# Patient Record
Sex: FEMALE | Race: BLACK OR AFRICAN AMERICAN | NOT HISPANIC OR LATINO | URBAN - METROPOLITAN AREA
[De-identification: names, ages, dates, MRNs, and addresses within clinical notes are randomized per-mention and may not be internally consistent; named-entity substitution may affect disease eponyms.]

---

## 2020-07-25 ENCOUNTER — TELEPHONE ENCOUNTER (OUTPATIENT)
Dept: URBAN - METROPOLITAN AREA CLINIC 13 | Facility: CLINIC | Age: 65
End: 2020-07-25

## 2020-07-25 RX ORDER — POLYETHYLENE GLYCOL 3350, SODIUM CHLORIDE, SODIUM BICARBONATE AND POTASSIUM CHLORIDE WITH LEMON FLAVOR 420; 11.2; 5.72; 1.48 G/4L; G/4L; G/4L; G/4L
TAKE 1/2 GALLON AT 5:00 PM DAY BEFORE PROCEDURE, TAKE SECOND 1/2 OF GALLON 6 HRS PRIOR TO PROCEDURE POWDER, FOR SOLUTION ORAL
Qty: 1 | Refills: 0 | OUTPATIENT
Start: 2018-08-27 | End: 2018-09-06

## 2020-07-25 RX ORDER — SUCRALFATE 1 G/1
TAKE 1 TABLET 3 TIMES DAILY PRN WITH MEALS TABLET ORAL
Qty: 90 | Refills: 2 | OUTPATIENT
Start: 2012-11-30 | End: 2013-01-31

## 2020-07-25 RX ORDER — POLYETHYLENE GLYCOL 3350, SODIUM CHLORIDE, SODIUM BICARBONATE AND POTASSIUM CHLORIDE WITH LEMON FLAVOR 420; 11.2; 5.72; 1.48 G/4L; G/4L; G/4L; G/4L
TAKE 1/2 GALLON AT 5:00 PM DAY BEFORE PROCEDURE, TAKE SECOND 1/2 OF GALLON 6 HRS PRIOR TO PROCEDURE POWDER, FOR SOLUTION ORAL
Qty: 1 | Refills: 0 | OUTPATIENT
Start: 2018-09-06 | End: 2018-09-10

## 2020-07-25 RX ORDER — VENLAFAXINE HYDROCHLORIDE 75 MG/1
TAKE 1 CAPSULE ONCE DAILY WITH FOOD CAPSULE, EXTENDED RELEASE ORAL
Qty: 30 | Refills: 3 | OUTPATIENT
Start: 2013-01-31 | End: 2013-03-26

## 2020-07-26 ENCOUNTER — TELEPHONE ENCOUNTER (OUTPATIENT)
Dept: URBAN - METROPOLITAN AREA CLINIC 13 | Facility: CLINIC | Age: 65
End: 2020-07-26

## 2023-04-26 ENCOUNTER — LAB OUTSIDE AN ENCOUNTER (OUTPATIENT)
Dept: URBAN - METROPOLITAN AREA CLINIC 113 | Facility: CLINIC | Age: 68
End: 2023-04-26

## 2023-04-26 ENCOUNTER — WEB ENCOUNTER (OUTPATIENT)
Dept: URBAN - METROPOLITAN AREA CLINIC 113 | Facility: CLINIC | Age: 68
End: 2023-04-26

## 2023-04-26 ENCOUNTER — OFFICE VISIT (OUTPATIENT)
Dept: URBAN - METROPOLITAN AREA CLINIC 113 | Facility: CLINIC | Age: 68
End: 2023-04-26
Payer: MEDICARE

## 2023-04-26 VITALS
BODY MASS INDEX: 29.15 KG/M2 | WEIGHT: 196.8 LBS | HEIGHT: 69 IN | HEART RATE: 78 BPM | SYSTOLIC BLOOD PRESSURE: 107 MMHG | TEMPERATURE: 97.3 F | DIASTOLIC BLOOD PRESSURE: 71 MMHG | RESPIRATION RATE: 18 BRPM

## 2023-04-26 DIAGNOSIS — K21.9 GASTROESOPHAGEAL REFLUX DISEASE, UNSPECIFIED WHETHER ESOPHAGITIS PRESENT: ICD-10-CM

## 2023-04-26 DIAGNOSIS — R10.13 EPIGASTRIC PAIN: ICD-10-CM

## 2023-04-26 PROCEDURE — 99204 OFFICE O/P NEW MOD 45 MIN: CPT | Performed by: INTERNAL MEDICINE

## 2023-04-26 RX ORDER — OMEPRAZOLE 40 MG/1
1 CAPSULE 30 MINUTES BEFORE MORNING MEAL CAPSULE, DELAYED RELEASE ORAL ONCE A DAY
Qty: 30 | OUTPATIENT
Start: 2023-04-26

## 2023-04-26 NOTE — HPI-TODAY'S VISIT:
Ms. Morales is a 67-year-old woman with a history of GERD presenting for evaluation of GERD and epigastric pain.  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  At that visit her right lower quadrant pain had resolved.  Her GERD symptoms were controlled on Zantac 300 mg daily.  Her last colonoscopy was performed on 9/10/2018 for colorectal cancer screening with findings notable for ascending colon and sigmoid colon diverticulosis and internal hemorrhoids.  She is recommended to repeat colon cancer screening in 10 years.  She reports having epigastric pain that is burning in character, waxes and wanes and occasionally feels "raw".  The pain is not exacerbated by eating but does improved taking Prilosec over-the-counter daily.  She denies any nausea or vomiting.  No NSAID use.  No weight loss.  She continues to have heartburn and regurgitation but no dysphagia.  Her bowel habits are regular normal consistency.  No melena or red blood per rectum.

## 2023-05-23 ENCOUNTER — OFFICE VISIT (OUTPATIENT)
Dept: URBAN - METROPOLITAN AREA SURGERY CENTER 25 | Facility: SURGERY CENTER | Age: 68
End: 2023-05-23
Payer: MEDICARE

## 2023-05-23 ENCOUNTER — CLAIMS CREATED FROM THE CLAIM WINDOW (OUTPATIENT)
Dept: URBAN - METROPOLITAN AREA CLINIC 4 | Facility: CLINIC | Age: 68
End: 2023-05-23
Payer: MEDICARE

## 2023-05-23 DIAGNOSIS — K31.A0 GASTRIC INTESTINAL METAPLASIA, UNSPECIFIED: ICD-10-CM

## 2023-05-23 DIAGNOSIS — R10.13 EPIGASTRIC PAIN: ICD-10-CM

## 2023-05-23 DIAGNOSIS — K29.60 OTHER GASTRITIS WITHOUT BLEEDING: ICD-10-CM

## 2023-05-23 DIAGNOSIS — K21.9 GASTROESOPHAGEAL REFLUX DISEASE, UNSPECIFIED WHETHER ESOPHAGITIS PRESENT: ICD-10-CM

## 2023-05-23 PROCEDURE — 43239 EGD BIOPSY SINGLE/MULTIPLE: CPT | Performed by: INTERNAL MEDICINE

## 2023-05-23 PROCEDURE — 88305 TISSUE EXAM BY PATHOLOGIST: CPT | Performed by: PATHOLOGY

## 2023-05-23 PROCEDURE — G8907 PT DOC NO EVENTS ON DISCHARG: HCPCS | Performed by: INTERNAL MEDICINE

## 2023-05-23 PROCEDURE — 88342 IMHCHEM/IMCYTCHM 1ST ANTB: CPT | Performed by: PATHOLOGY

## 2023-05-23 RX ORDER — OMEPRAZOLE 40 MG/1
1 CAPSULE 30 MINUTES BEFORE MORNING MEAL CAPSULE, DELAYED RELEASE ORAL ONCE A DAY
Qty: 30 | Status: ACTIVE | COMMUNITY
Start: 2023-04-26

## 2023-06-22 ENCOUNTER — DASHBOARD ENCOUNTERS (OUTPATIENT)
Age: 68
End: 2023-06-22

## 2023-06-22 ENCOUNTER — OFFICE VISIT (OUTPATIENT)
Dept: URBAN - METROPOLITAN AREA CLINIC 113 | Facility: CLINIC | Age: 68
End: 2023-06-22
Payer: MEDICARE

## 2023-06-22 VITALS
RESPIRATION RATE: 20 BRPM | HEIGHT: 69 IN | SYSTOLIC BLOOD PRESSURE: 155 MMHG | HEART RATE: 93 BPM | TEMPERATURE: 97.6 F | WEIGHT: 193 LBS | BODY MASS INDEX: 28.58 KG/M2 | DIASTOLIC BLOOD PRESSURE: 97 MMHG

## 2023-06-22 DIAGNOSIS — K29.50 CHRONIC GASTRITIS WITHOUT BLEEDING, UNSPECIFIED GASTRITIS TYPE: ICD-10-CM

## 2023-06-22 DIAGNOSIS — R10.13 EPIGASTRIC PAIN: ICD-10-CM

## 2023-06-22 DIAGNOSIS — K21.9 GERD WITHOUT ESOPHAGITIS: ICD-10-CM

## 2023-06-22 PROBLEM — 266435005: Status: ACTIVE | Noted: 2023-06-22

## 2023-06-22 PROBLEM — 8493009: Status: ACTIVE | Noted: 2023-06-22

## 2023-06-22 PROCEDURE — 99214 OFFICE O/P EST MOD 30 MIN: CPT | Performed by: NURSE PRACTITIONER

## 2023-06-22 RX ORDER — OMEPRAZOLE 20 MG/1
1 CAPSULE DAILY AS NEEDED CAPSULE, DELAYED RELEASE ORAL
Status: ACTIVE | COMMUNITY
Start: 2023-04-26

## 2023-06-22 RX ORDER — OMEPRAZOLE 40 MG/1
1 CAPSULE 30 MINUTES BEFORE MORNING MEAL CAPSULE, DELAYED RELEASE ORAL ONCE A DAY
Qty: 30 | Refills: 5 | OUTPATIENT
Start: 2023-06-22

## 2023-06-22 NOTE — HPI-TODAY'S VISIT:
This is a 67-year-old woman with a history of GERD and epigastric pain presenting for follow-up after an EGD. She was last seen in the office 4/26/2023 for evaluation of worsening acid reflux and epigastric pain.  It was discussed this may be secondary to undertreated acid peptic disease/peptic ulcer disease.  She was to change Prilosec over-the-counter to omeprazole 40 mg daily and was scheduled for an EGD. EGD 5/23/2023:Irregular Z-line otherwise normal esophagus, small hiatal hernia, nonerosive gastropathy status postbiopsy, normal examined duodenum.  Pathology: Stomach antral and body biopsies demonstrated chronic inactive gastritis with histological changes suggestive of treated H. pylori gastritis without evidence of H. pylori or intestinal metaplasia, dysplasia, or malignancy. Alternative methods to test for H. pylori infection using a breath test was to be discussed at follow-up. She has been sleeping with her head of bed elevated for years due to an occasional heartburn.  She is taking Prilosec 20 mg as needed requiring it 3-4 times per week for heartburn or burning indicated in the epigastrium associated with "bubbling."  She has a history of infrequent, severe pain indicating epigastrium described as deep burning that "feels like it touches the nerve and makes me dizzy."  She reports this is relieved with taking Tagamet and Maalox.  She has been informed in the past that this is likely associated with a vagal reaction.  This occurs infrequently and less often if she is taking medication for acid reflux.  She denies any other abdominal symptoms.

## 2023-06-22 NOTE — HPI-OTHER HISTORIES
EGD 5/23/2023:Irregular Z-line otherwise normal esophagus, small hiatal hernia, nonerosive gastropathy status postbiopsy, normal examined duodenum.  Pathology: Stomach antral and body biopsies demonstrated chronic inactive gastritis with histological changes suggestive of treated H. pylori gastritis without evidence of H. pylori or intestinal metaplasia, dysplasia, or malignancy.

## 2023-06-23 ENCOUNTER — WEB ENCOUNTER (OUTPATIENT)
Dept: URBAN - METROPOLITAN AREA CLINIC 113 | Facility: CLINIC | Age: 68
End: 2023-06-23

## 2023-07-07 LAB
H PYLORI BREATH TEST: NOT DETECTED
H. PYLORI BREATH TEST: NOT DETECTED
INTERPRETATION: NOT DETECTED

## 2025-03-21 ENCOUNTER — WEB ENCOUNTER (OUTPATIENT)
Dept: URBAN - METROPOLITAN AREA CLINIC 113 | Facility: CLINIC | Age: 70
End: 2025-03-21

## 2025-04-15 ENCOUNTER — OFFICE VISIT (OUTPATIENT)
Dept: URBAN - METROPOLITAN AREA CLINIC 113 | Facility: CLINIC | Age: 70
End: 2025-04-15
Payer: MEDICARE

## 2025-04-15 DIAGNOSIS — K29.50 CHRONIC GASTRITIS WITHOUT BLEEDING, UNSPECIFIED GASTRITIS TYPE: ICD-10-CM

## 2025-04-15 DIAGNOSIS — K21.9 GERD WITHOUT ESOPHAGITIS: ICD-10-CM

## 2025-04-15 PROCEDURE — 99214 OFFICE O/P EST MOD 30 MIN: CPT

## 2025-04-15 RX ORDER — FAMOTIDINE 20 MG/1
1 TABLET AT BEDTIME AS NEEDED TABLET, FILM COATED ORAL ONCE A DAY
Status: ACTIVE | COMMUNITY

## 2025-04-15 NOTE — PHYSICAL EXAM PSYCH:
normal mood with appropriate affect , good eye contact Patient/Caregiver provided printed discharge information.

## 2025-04-15 NOTE — HPI-TODAY'S VISIT:
Ms. Morales is a 69-year-old woman with a history of GERD and epigastric pain presenting for long interval follow-up.  She was last seen in office on 6/22/2023 regarding epigastric pain for which she was currently taking prilosec over-counter as needed requiring 3-4 times per week.  Recent EGD notable for an irregular Z-line, small hiatal hernia, nonerosive gastropathy with features suggestive of treated H. pylori gastritis without evidence of H. pylori.  Recommend she continue lifestyle modifications for acid reflux.  Recommend she hold PPI for 10 days prior to coming in for an H. pylori breath test otherwise recommend increasing omeprazole to 40 mg p.o. daily.  Her H. pylori breath test was negative.  Today she reports she was having a difficult time with abdominal discomfort worse with meals. She was taking otc prilosec and pepcid, this provided some relief. This past weekend things worsened. Early Saturday morning she was having epigastric burning that radiated into her esophagus, it's improved slightly since then. Denies difficulty swallowing. She feels the PPIs make her anxious. She reports some dark stool with starting pepto bismol. She was seen at the ER in January for palpitations. She is seeing Cardiologist due to palpitations. Dr. May, saw them last on 4/1, she reports they found a "leakage" in her heart She is having regurgitation.

## 2025-04-19 ENCOUNTER — WEB ENCOUNTER (OUTPATIENT)
Dept: URBAN - METROPOLITAN AREA CLINIC 113 | Facility: CLINIC | Age: 70
End: 2025-04-19

## 2025-04-28 ENCOUNTER — WEB ENCOUNTER (OUTPATIENT)
Dept: URBAN - METROPOLITAN AREA CLINIC 113 | Facility: CLINIC | Age: 70
End: 2025-04-28